# Patient Record
Sex: MALE | Race: OTHER | HISPANIC OR LATINO | URBAN - METROPOLITAN AREA
[De-identification: names, ages, dates, MRNs, and addresses within clinical notes are randomized per-mention and may not be internally consistent; named-entity substitution may affect disease eponyms.]

---

## 2017-04-28 ENCOUNTER — EMERGENCY (EMERGENCY)
Facility: HOSPITAL | Age: 32
LOS: 1 days | Discharge: HOME | End: 2017-04-28
Attending: EMERGENCY MEDICINE | Admitting: EMERGENCY MEDICINE
Payer: SELF-PAY

## 2017-04-28 VITALS
RESPIRATION RATE: 18 BRPM | HEIGHT: 66 IN | HEART RATE: 74 BPM | SYSTOLIC BLOOD PRESSURE: 112 MMHG | TEMPERATURE: 98 F | OXYGEN SATURATION: 99 % | DIASTOLIC BLOOD PRESSURE: 79 MMHG | WEIGHT: 175.05 LBS

## 2017-04-28 VITALS — DIASTOLIC BLOOD PRESSURE: 80 MMHG | SYSTOLIC BLOOD PRESSURE: 110 MMHG | HEART RATE: 78 BPM

## 2017-04-28 PROCEDURE — 99284 EMERGENCY DEPT VISIT MOD MDM: CPT

## 2017-04-28 PROCEDURE — 96372 THER/PROPH/DIAG INJ SC/IM: CPT

## 2017-04-28 PROCEDURE — 99284 EMERGENCY DEPT VISIT MOD MDM: CPT | Mod: 25

## 2017-04-28 PROCEDURE — 72100 X-RAY EXAM L-S SPINE 2/3 VWS: CPT | Mod: 26

## 2017-04-28 PROCEDURE — 73030 X-RAY EXAM OF SHOULDER: CPT | Mod: 26,RT

## 2017-04-28 PROCEDURE — 72100 X-RAY EXAM L-S SPINE 2/3 VWS: CPT

## 2017-04-28 PROCEDURE — 73030 X-RAY EXAM OF SHOULDER: CPT

## 2017-04-28 PROCEDURE — T1013: CPT

## 2017-04-28 RX ORDER — METHOCARBAMOL 500 MG/1
1 TABLET, FILM COATED ORAL
Qty: 15 | Refills: 0 | OUTPATIENT
Start: 2017-04-28

## 2017-04-28 RX ORDER — HYDROMORPHONE HYDROCHLORIDE 2 MG/ML
0.5 INJECTION INTRAMUSCULAR; INTRAVENOUS; SUBCUTANEOUS ONCE
Qty: 0 | Refills: 0 | Status: DISCONTINUED | OUTPATIENT
Start: 2017-04-28 | End: 2017-04-28

## 2017-04-28 RX ORDER — DEXAMETHASONE 0.5 MG/5ML
10 ELIXIR ORAL ONCE
Qty: 0 | Refills: 0 | Status: COMPLETED | OUTPATIENT
Start: 2017-04-28 | End: 2017-04-28

## 2017-04-28 RX ORDER — IBUPROFEN 200 MG
1 TABLET ORAL
Qty: 20 | Refills: 0 | OUTPATIENT
Start: 2017-04-28

## 2017-04-28 RX ADMIN — HYDROMORPHONE HYDROCHLORIDE 0.5 MILLIGRAM(S): 2 INJECTION INTRAMUSCULAR; INTRAVENOUS; SUBCUTANEOUS at 19:40

## 2017-04-28 RX ADMIN — Medication 10 MILLIGRAM(S): at 19:39

## 2017-04-28 NOTE — ED STATDOCS - PROGRESS NOTE DETAILS
Pt states he was at work when he attempted to lift a heavy box of alexa, lost his balance and fell backwards onto cardboard boxes. Denies any head injury or LOC. Complaining of lower back pain, described as dull achy sensation. admits to shoulder pain but resolved on its own.  Denies abd pain/CP/SOB/palpitations/N/V/LE weakness or tingling sensation.   Gen- NAD chest-s1s2 Lungs- CTA b/l Abd- soft, Nt, ND Back- no midline tenderness, + left and right sided lumbar paraspinal TTP, Neuro- no neuro deficts. Pt re-assessed, pain improving, pt able to ambualte more comfortable. Results discussed with pt, advised to apply heat, take meds as directed, f.u with ortho. if any symptoms worsen or any new symptoms occur, return to ED>

## 2017-04-28 NOTE — ED STATDOCS - ATTENDING CONTRIBUTION TO CARE
I, Chris Vences, performed the initial face to face bedside interview with this patient regarding history of present illness, review of symptoms and relevant past medical, social and family history.  I completed an independent physical examination.  I was the initial provider who evaluated this patient. I have signed out the follow up of any pending tests (i.e. labs, radiological studies) to the ACP.  I have communicated the patient’s plan of care and disposition with the ACP.  The history, relevant review of systems, past medical and surgical history, medical decision making, and physical examination was documented by the scribe in my presence and I attest to the accuracy of the documentation.

## 2017-04-28 NOTE — ED STATDOCS - OBJECTIVE STATEMENT
30 y/o M pt presents to ED c/o lower back pain that radiates from left to right side and right shoulder pain since earlier today. Pt reports he tried to lift a heavy box of alexa at work today when he fell backwards and hurt his back. Pt denies LOC, head injury, CP, SOB, abdominal pain, nausea, vomiting, incontinence, and numbness/tingling. No further complaints at this time. NKDA.  : Samantha

## 2019-03-01 ENCOUNTER — EMERGENCY (EMERGENCY)
Facility: HOSPITAL | Age: 34
LOS: 1 days | End: 2019-03-01

## 2019-03-01 VITALS
SYSTOLIC BLOOD PRESSURE: 113 MMHG | DIASTOLIC BLOOD PRESSURE: 70 MMHG | OXYGEN SATURATION: 99 % | RESPIRATION RATE: 18 BRPM | WEIGHT: 184.97 LBS | HEIGHT: 66 IN | HEART RATE: 60 BPM | TEMPERATURE: 99 F

## 2019-03-02 ENCOUNTER — EMERGENCY (EMERGENCY)
Facility: HOSPITAL | Age: 34
LOS: 1 days | Discharge: HOME | End: 2019-03-02
Attending: EMERGENCY MEDICINE | Admitting: EMERGENCY MEDICINE
Payer: COMMERCIAL

## 2019-03-02 VITALS
RESPIRATION RATE: 18 BRPM | WEIGHT: 186.07 LBS | SYSTOLIC BLOOD PRESSURE: 113 MMHG | DIASTOLIC BLOOD PRESSURE: 68 MMHG | OXYGEN SATURATION: 100 % | HEART RATE: 70 BPM | TEMPERATURE: 98 F | HEIGHT: 66 IN

## 2019-03-02 PROCEDURE — T1013: CPT

## 2019-03-02 PROCEDURE — 99283 EMERGENCY DEPT VISIT LOW MDM: CPT | Mod: 25

## 2019-03-02 PROCEDURE — 96372 THER/PROPH/DIAG INJ SC/IM: CPT

## 2019-03-02 PROCEDURE — 99283 EMERGENCY DEPT VISIT LOW MDM: CPT

## 2019-03-02 RX ORDER — KETOROLAC TROMETHAMINE 30 MG/ML
60 SYRINGE (ML) INJECTION ONCE
Qty: 0 | Refills: 0 | Status: DISCONTINUED | OUTPATIENT
Start: 2019-03-02 | End: 2019-03-02

## 2019-03-02 RX ORDER — METHOCARBAMOL 500 MG/1
2 TABLET, FILM COATED ORAL
Qty: 30 | Refills: 0 | OUTPATIENT
Start: 2019-03-02 | End: 2019-03-06

## 2019-03-02 RX ORDER — LIDOCAINE 4 G/100G
1 CREAM TOPICAL ONCE
Qty: 0 | Refills: 0 | Status: COMPLETED | OUTPATIENT
Start: 2019-03-02 | End: 2019-03-02

## 2019-03-02 RX ORDER — IBUPROFEN 200 MG
1 TABLET ORAL
Qty: 9 | Refills: 0 | OUTPATIENT
Start: 2019-03-02 | End: 2019-03-04

## 2019-03-02 RX ORDER — METHOCARBAMOL 500 MG/1
750 TABLET, FILM COATED ORAL ONCE
Qty: 0 | Refills: 0 | Status: COMPLETED | OUTPATIENT
Start: 2019-03-02 | End: 2019-03-02

## 2019-03-02 RX ADMIN — METHOCARBAMOL 750 MILLIGRAM(S): 500 TABLET, FILM COATED ORAL at 11:28

## 2019-03-02 RX ADMIN — Medication 60 MILLIGRAM(S): at 11:28

## 2019-03-02 RX ADMIN — LIDOCAINE 1 PATCH: 4 CREAM TOPICAL at 11:29

## 2019-03-02 NOTE — ED ADULT TRIAGE NOTE - NS ED NURSE DIRECT TO ROOM YN
0747: Code called. No pulse and pt unresponsive.  0748: Pt responsive after sternal rub and pulse felt by Sammie Skinner RN while placing pt in CPR position. No compressions done.  0749: Cecy Meraz NP and  at bedside            STAT EKG ordered and pt placed on nonrebreather            Code stopped and rapid response initiated  0750:IM K paged. /85 BP, 95% nonrebreather, 46 HR, 122 MAP, blood glucose 140, pt responsive to voice and following commands  0751: Abelardo Bazan MD arrived  0753: EKG performed; 190/81 BP, 117 MAP, 77 HR, 99%                nonrebreather  0754: VS: 182/79, 100% nonrebreather, 80 HR, 114 MAP  0806: rapid response stopped  
No

## 2019-03-02 NOTE — ED STATDOCS - PHYSICAL EXAMINATION
Const: Appears well, in no acute distress  HEENT: No conjunctival injection, no rhinorrhea, tongue midline  Neck: No midline TTP, + left upper paraspinal muscular TTP without hypertonicity. Decreased ROM with rotation to the right and with extension 2/2 pain.  Cardiac: RRR, + S1/S2, no murmurs  Resp: CTA B/L, no wheezes  ABD: Soft, NT/ND  Ext: Full, symmetrical ROM  Neuro: 5/5  strength bilateral upper extremities, normal sensation symmetrically bilateral upper extremities.  Skin: No rashes or lacerations appreciated.

## 2019-03-02 NOTE — ED STATDOCS - NS ED ROS FT
Const: no fevers, no chills  HEENT: no rhinorrhea, no sore throat  Cardiac: no palpitations, no chest pain  Resp: no wheezing, no SOB  ABD: no ABD pain, no vomiting, no diarrhea  : no dysuria, no discharge  MSK: + Left sided neck pain  Ext: no deformity  Skin: no rashes, no lacerations

## 2019-03-02 NOTE — ED STATDOCS - CLINICAL SUMMARY MEDICAL DECISION MAKING FREE TEXT BOX
Patient presents complaining of one week of paraspinal left sided neck pain which started while working his physical job. No neuro deficits, no bony TTP, no trauma. Decreased ROM with rotation to the right and extension 2/2 pain. Neuro intact. Will give toradol IM, lidoderm patch and robaxin and send Rx to pharmacy. Recommended a few days off from work for rest.

## 2019-03-02 NOTE — ED STATDOCS - OBJECTIVE STATEMENT
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 It has not worsened, just not improved. He describes the pain as if "there's something there." The pain is exacerbated by moving his neck around. It does not radiate. He has tried ibuprofen and diclofenac without improvement. He last took the medication at 9 pm last night. He has otherwise been in his usual state of good health. 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 It has not worsened, just not improved. He describes the pain as if "there's something there." The pain is exacerbated by moving his neck around. It does not radiate. He has tried ibuprofen and diclofenac without improvement. He last took the medication at 9 pm last night. He has otherwise been in his usual state of good health.

## 2019-04-06 ENCOUNTER — EMERGENCY (EMERGENCY)
Facility: HOSPITAL | Age: 34
LOS: 1 days | Discharge: HOME | End: 2019-04-06
Attending: EMERGENCY MEDICINE
Payer: COMMERCIAL

## 2019-04-06 VITALS
HEART RATE: 61 BPM | SYSTOLIC BLOOD PRESSURE: 111 MMHG | TEMPERATURE: 99 F | HEIGHT: 66 IN | WEIGHT: 184.09 LBS | RESPIRATION RATE: 20 BRPM | DIASTOLIC BLOOD PRESSURE: 60 MMHG | OXYGEN SATURATION: 99 %

## 2019-04-06 VITALS
TEMPERATURE: 98 F | DIASTOLIC BLOOD PRESSURE: 74 MMHG | SYSTOLIC BLOOD PRESSURE: 130 MMHG | RESPIRATION RATE: 20 BRPM | HEART RATE: 72 BPM | OXYGEN SATURATION: 97 %

## 2019-04-06 LAB
ALBUMIN SERPL ELPH-MCNC: 4.7 G/DL — SIGNIFICANT CHANGE UP (ref 3.3–5.2)
ALP SERPL-CCNC: 66 U/L — SIGNIFICANT CHANGE UP (ref 40–120)
ALT FLD-CCNC: 24 U/L — SIGNIFICANT CHANGE UP
ANION GAP SERPL CALC-SCNC: 12 MMOL/L — SIGNIFICANT CHANGE UP (ref 5–17)
AST SERPL-CCNC: 15 U/L — SIGNIFICANT CHANGE UP
BASOPHILS # BLD AUTO: 0 K/UL — SIGNIFICANT CHANGE UP (ref 0–0.2)
BASOPHILS NFR BLD AUTO: 0.2 % — SIGNIFICANT CHANGE UP (ref 0–2)
BILIRUB SERPL-MCNC: 0.4 MG/DL — SIGNIFICANT CHANGE UP (ref 0.4–2)
BUN SERPL-MCNC: 14 MG/DL — SIGNIFICANT CHANGE UP (ref 8–20)
CALCIUM SERPL-MCNC: 9.4 MG/DL — SIGNIFICANT CHANGE UP (ref 8.6–10.2)
CHLORIDE SERPL-SCNC: 103 MMOL/L — SIGNIFICANT CHANGE UP (ref 98–107)
CO2 SERPL-SCNC: 26 MMOL/L — SIGNIFICANT CHANGE UP (ref 22–29)
CREAT SERPL-MCNC: 1 MG/DL — SIGNIFICANT CHANGE UP (ref 0.5–1.3)
EOSINOPHIL # BLD AUTO: 0.1 K/UL — SIGNIFICANT CHANGE UP (ref 0–0.5)
EOSINOPHIL NFR BLD AUTO: 0.9 % — SIGNIFICANT CHANGE UP (ref 0–5)
GLUCOSE SERPL-MCNC: 79 MG/DL — SIGNIFICANT CHANGE UP (ref 70–115)
HCT VFR BLD CALC: 44.1 % — SIGNIFICANT CHANGE UP (ref 42–52)
HGB BLD-MCNC: 15.8 G/DL — SIGNIFICANT CHANGE UP (ref 14–18)
LYMPHOCYTES # BLD AUTO: 1.3 K/UL — SIGNIFICANT CHANGE UP (ref 1–4.8)
LYMPHOCYTES # BLD AUTO: 15 % — LOW (ref 20–55)
MCHC RBC-ENTMCNC: 27.1 PG — SIGNIFICANT CHANGE UP (ref 27–31)
MCHC RBC-ENTMCNC: 35.8 G/DL — SIGNIFICANT CHANGE UP (ref 32–36)
MCV RBC AUTO: 75.8 FL — LOW (ref 80–94)
MONOCYTES # BLD AUTO: 0.5 K/UL — SIGNIFICANT CHANGE UP (ref 0–0.8)
MONOCYTES NFR BLD AUTO: 5.2 % — SIGNIFICANT CHANGE UP (ref 3–10)
NEUTROPHILS # BLD AUTO: 7 K/UL — SIGNIFICANT CHANGE UP (ref 1.8–8)
NEUTROPHILS NFR BLD AUTO: 78.6 % — HIGH (ref 37–73)
PLATELET # BLD AUTO: 163 K/UL — SIGNIFICANT CHANGE UP (ref 150–400)
POTASSIUM SERPL-MCNC: 4.2 MMOL/L — SIGNIFICANT CHANGE UP (ref 3.5–5.3)
POTASSIUM SERPL-SCNC: 4.2 MMOL/L — SIGNIFICANT CHANGE UP (ref 3.5–5.3)
PROT SERPL-MCNC: 7.3 G/DL — SIGNIFICANT CHANGE UP (ref 6.6–8.7)
RBC # BLD: 5.82 M/UL — SIGNIFICANT CHANGE UP (ref 4.6–6.2)
RBC # FLD: 13.3 % — SIGNIFICANT CHANGE UP (ref 11–15.6)
SODIUM SERPL-SCNC: 141 MMOL/L — SIGNIFICANT CHANGE UP (ref 135–145)
WBC # BLD: 8.9 K/UL — SIGNIFICANT CHANGE UP (ref 4.8–10.8)
WBC # FLD AUTO: 8.9 K/UL — SIGNIFICANT CHANGE UP (ref 4.8–10.8)

## 2019-04-06 PROCEDURE — 70487 CT MAXILLOFACIAL W/DYE: CPT

## 2019-04-06 PROCEDURE — 80053 COMPREHEN METABOLIC PANEL: CPT

## 2019-04-06 PROCEDURE — 70487 CT MAXILLOFACIAL W/DYE: CPT | Mod: 26

## 2019-04-06 PROCEDURE — 99284 EMERGENCY DEPT VISIT MOD MDM: CPT

## 2019-04-06 PROCEDURE — 99284 EMERGENCY DEPT VISIT MOD MDM: CPT | Mod: 25

## 2019-04-06 PROCEDURE — 36415 COLL VENOUS BLD VENIPUNCTURE: CPT

## 2019-04-06 PROCEDURE — T1013: CPT

## 2019-04-06 PROCEDURE — 96374 THER/PROPH/DIAG INJ IV PUSH: CPT | Mod: XU

## 2019-04-06 PROCEDURE — 85027 COMPLETE CBC AUTOMATED: CPT

## 2019-04-06 RX ORDER — KETOROLAC TROMETHAMINE 30 MG/ML
30 SYRINGE (ML) INJECTION ONCE
Qty: 0 | Refills: 0 | Status: DISCONTINUED | OUTPATIENT
Start: 2019-04-06 | End: 2019-04-06

## 2019-04-06 RX ORDER — IBUPROFEN 200 MG
1 TABLET ORAL
Qty: 28 | Refills: 0 | OUTPATIENT
Start: 2019-04-06 | End: 2019-04-12

## 2019-04-06 RX ADMIN — Medication 30 MILLIGRAM(S): at 15:37

## 2019-04-06 NOTE — ED PROVIDER NOTE - PROGRESS NOTE DETAILS
BETZY Toro: Went to CT. Only one nurse. There is one patient ahead in line. BETZY Toro: Patient stable for discharge, reporting improvement. Vital signs are stable, tolerating PO, ambulatory in ED. Discussed with patient including but not limited to results, proper medication use, and precautions/potential side effects. As discussed, patient to follow up with PMD in 24-48 hours as well as any specialists that may have been discussed/provided. Patient received copies of lab/radiology results to aid in proper outpatient follow up. Educated on return precautions. Advised to seek immediate medical attention for any new/worsening signs/symptoms or concerns. Patient/guardian provided ample opportunity to ask questions which were answered to the fullest extent. Patient/guardian verbalized understanding and agreement of plan. BETZY Toro: Language Services utilized in obtaining the HPI and throughout the duration of the patient's care.

## 2019-04-06 NOTE — ED PROVIDER NOTE - OBJECTIVE STATEMENT
34 yo male no PMHx c/o ear pain x4 hour. States first noticed while chewing and experienced pain. Describes 5/10, sharp pain. Did not self-medicate PTA. Associated sxms include swelling. Received vaccinations as a child. No further complaints at this time.   Denies fever/chills, testicular pain/swelling.  PCP: None

## 2019-04-06 NOTE — ED PROVIDER NOTE - CLINICAL SUMMARY MEDICAL DECISION MAKING FREE TEXT BOX
34 yo male no PMHx c/o ear pain x4 hours. Concern for parotiditis.   Plan:  1. Right-sided facial swelling, consider parotiditis  - Labs  - CT  - Pain control  - Reassess

## 2019-04-06 NOTE — ED ADULT NURSE NOTE - OBJECTIVE STATEMENT
pt c/o right ear pain pt c/o right ear pain that started this morning. pt states he noticed pain when he was chewing. there are no obvious deformity noted. pt is awaiting ct at this time.

## 2019-04-06 NOTE — ED PROVIDER NOTE - ATTENDING CONTRIBUTION TO CARE
I, Genet Pfeiffer, performed the initial face to face bedside interview with this patient regarding history of present illness, review of symptoms and relevant past medical, social and family history.  I completed an independent physical examination.  I was the initial provider who evaluated this patient. I have signed out the follow up of any pending tests (i.e. labs, radiological studies) to the ACP.  I have communicated the patient’s plan of care and disposition with the ACP.

## 2019-04-06 NOTE — ED PROVIDER NOTE - PHYSICAL EXAMINATION
General: In NAD, non-toxic appearing; well nourished/developed.  Skin: No rashes or lesions. Warm, dry, color normal for race. No cyanosis or jaundice appreciated.   Eyes: Sclera anicteric, conjunctivae clear b/l. No discharge. PERRLA, EOMI.  Ears: Auricles/tragi symmetrical without lesions/deformities, non-tender b/l. Canals clear and without erythema b/l. TMs pearly, grey, mobile, with landmarks and light reflex intact b/l.  Mouth/Throat: Airway patent. Tolerating secretions, no drooling. Lips and buccal mucosa pink, moist, and without lesions. Swelling and TTP over right parotid gland; no stones palpated. No surrounding erythema or signs of abscess. Tongue midline, no lesions. Tonsils and pharynx without erythema or exudates. Tonsils not enlarged. Uvula midline, rises symmetrically.  Neck: Supple, FROM. No lymphadenopathy or pre/postauricular, occipital, tonsillar, submandibular, submental, superficial/deep cervical, or supraclavicular nodes. Trachea midline.  Cardio: No lifts, heaves, visible pulsations. No thrills. Rate and rhythm regular, S1 & S2 clear. No audible murmur, gallop, or rub.   Resp: Normal AP to lateral diameter, symmetrical excursion b/l. Breath sounds vesicular, symmetrical and without rales, rhonchi or wheezing b/l. General: In NAD, non-toxic appearing; well nourished/developed.  Skin: No rashes or lesions. Warm, dry, color normal for race. No cyanosis or jaundice appreciated.   Eyes: Sclera anicteric, conjunctivae clear b/l. No discharge. PERRLA, EOMI.  Ears: Auricles/tragi symmetrical without lesions/deformities, non-tender b/l. Canals clear and without erythema b/l. TMs pearly, grey, mobile, with landmarks and light reflex intact b/l.  Mouth/Throat: Airway patent. Tolerating secretions, no drooling. Lips and buccal mucosa pink, moist, and without lesions. Swelling and TTP over right parotid gland/prearicular area extending to mandible ; no stones palpated. No surrounding erythema or signs of abscess. Tongue midline, no lesions. Tonsils and pharynx without erythema or exudates. Tonsils not enlarged. Uvula midline, rises symmetrically.  Neck: Supple, FROM. No lymphadenopathy or pre/postauricular, occipital, tonsillar, submandibular, submental, superficial/deep cervical, or supraclavicular nodes. Trachea midline.  Cardio: No lifts, heaves, visible pulsations. No thrills. Rate and rhythm regular, S1 & S2 clear. No audible murmur, gallop, or rub.   Resp: Normal AP to lateral diameter, symmetrical excursion b/l. Breath sounds vesicular, symmetrical and without rales, rhonchi or wheezing b/l.

## 2020-02-11 NOTE — ED PROVIDER NOTE - TEMPLATE, MLM
Alert-The patient is alert, awake and responds to voice. The patient is oriented to time, place, and person. The triage nurse is able to obtain subjective information.
EENMT

## 2020-10-02 ENCOUNTER — EMERGENCY (EMERGENCY)
Facility: HOSPITAL | Age: 35
LOS: 1 days | Discharge: HOME | End: 2020-10-02
Attending: EMERGENCY MEDICINE
Payer: COMMERCIAL

## 2020-10-02 VITALS
SYSTOLIC BLOOD PRESSURE: 124 MMHG | RESPIRATION RATE: 15 BRPM | OXYGEN SATURATION: 99 % | TEMPERATURE: 98 F | DIASTOLIC BLOOD PRESSURE: 81 MMHG | HEART RATE: 61 BPM

## 2020-10-02 VITALS
WEIGHT: 149.91 LBS | HEART RATE: 58 BPM | OXYGEN SATURATION: 99 % | DIASTOLIC BLOOD PRESSURE: 85 MMHG | TEMPERATURE: 99 F | RESPIRATION RATE: 16 BRPM | SYSTOLIC BLOOD PRESSURE: 145 MMHG | HEIGHT: 66 IN

## 2020-10-02 LAB
ALBUMIN SERPL ELPH-MCNC: 4.4 G/DL — SIGNIFICANT CHANGE UP (ref 3.3–5.2)
ALP SERPL-CCNC: 64 U/L — SIGNIFICANT CHANGE UP (ref 40–120)
ALT FLD-CCNC: 32 U/L — SIGNIFICANT CHANGE UP
ANION GAP SERPL CALC-SCNC: 10 MMOL/L — SIGNIFICANT CHANGE UP (ref 5–17)
AST SERPL-CCNC: 18 U/L — SIGNIFICANT CHANGE UP
BASOPHILS # BLD AUTO: 0.03 K/UL — SIGNIFICANT CHANGE UP (ref 0–0.2)
BASOPHILS NFR BLD AUTO: 0.4 % — SIGNIFICANT CHANGE UP (ref 0–2)
BILIRUB SERPL-MCNC: 0.3 MG/DL — LOW (ref 0.4–2)
BUN SERPL-MCNC: 17 MG/DL — SIGNIFICANT CHANGE UP (ref 8–20)
CALCIUM SERPL-MCNC: 8.9 MG/DL — SIGNIFICANT CHANGE UP (ref 8.6–10.2)
CHLORIDE SERPL-SCNC: 104 MMOL/L — SIGNIFICANT CHANGE UP (ref 98–107)
CO2 SERPL-SCNC: 25 MMOL/L — SIGNIFICANT CHANGE UP (ref 22–29)
CREAT SERPL-MCNC: 1.21 MG/DL — SIGNIFICANT CHANGE UP (ref 0.5–1.3)
D DIMER BLD IA.RAPID-MCNC: <150 NG/ML DDU — SIGNIFICANT CHANGE UP
EOSINOPHIL # BLD AUTO: 0.08 K/UL — SIGNIFICANT CHANGE UP (ref 0–0.5)
EOSINOPHIL NFR BLD AUTO: 1.1 % — SIGNIFICANT CHANGE UP (ref 0–6)
GLUCOSE SERPL-MCNC: 95 MG/DL — SIGNIFICANT CHANGE UP (ref 70–99)
HCT VFR BLD CALC: 43.5 % — SIGNIFICANT CHANGE UP (ref 39–50)
HGB BLD-MCNC: 14.9 G/DL — SIGNIFICANT CHANGE UP (ref 13–17)
IMM GRANULOCYTES NFR BLD AUTO: 0.3 % — SIGNIFICANT CHANGE UP (ref 0–1.5)
LYMPHOCYTES # BLD AUTO: 1.54 K/UL — SIGNIFICANT CHANGE UP (ref 1–3.3)
LYMPHOCYTES # BLD AUTO: 21.2 % — SIGNIFICANT CHANGE UP (ref 13–44)
MCHC RBC-ENTMCNC: 26.2 PG — LOW (ref 27–34)
MCHC RBC-ENTMCNC: 34.3 GM/DL — SIGNIFICANT CHANGE UP (ref 32–36)
MCV RBC AUTO: 76.4 FL — LOW (ref 80–100)
MONOCYTES # BLD AUTO: 0.3 K/UL — SIGNIFICANT CHANGE UP (ref 0–0.9)
MONOCYTES NFR BLD AUTO: 4.1 % — SIGNIFICANT CHANGE UP (ref 2–14)
NEUTROPHILS # BLD AUTO: 5.29 K/UL — SIGNIFICANT CHANGE UP (ref 1.8–7.4)
NEUTROPHILS NFR BLD AUTO: 72.9 % — SIGNIFICANT CHANGE UP (ref 43–77)
PLATELET # BLD AUTO: 152 K/UL — SIGNIFICANT CHANGE UP (ref 150–400)
POTASSIUM SERPL-MCNC: 4.1 MMOL/L — SIGNIFICANT CHANGE UP (ref 3.5–5.3)
POTASSIUM SERPL-SCNC: 4.1 MMOL/L — SIGNIFICANT CHANGE UP (ref 3.5–5.3)
PROT SERPL-MCNC: 6.7 G/DL — SIGNIFICANT CHANGE UP (ref 6.6–8.7)
RBC # BLD: 5.69 M/UL — SIGNIFICANT CHANGE UP (ref 4.2–5.8)
RBC # FLD: 13 % — SIGNIFICANT CHANGE UP (ref 10.3–14.5)
SODIUM SERPL-SCNC: 139 MMOL/L — SIGNIFICANT CHANGE UP (ref 135–145)
TROPONIN T SERPL-MCNC: <0.01 NG/ML — SIGNIFICANT CHANGE UP (ref 0–0.06)
WBC # BLD: 7.26 K/UL — SIGNIFICANT CHANGE UP (ref 3.8–10.5)
WBC # FLD AUTO: 7.26 K/UL — SIGNIFICANT CHANGE UP (ref 3.8–10.5)

## 2020-10-02 PROCEDURE — 99285 EMERGENCY DEPT VISIT HI MDM: CPT

## 2020-10-02 PROCEDURE — 99284 EMERGENCY DEPT VISIT MOD MDM: CPT | Mod: 25

## 2020-10-02 PROCEDURE — 84484 ASSAY OF TROPONIN QUANT: CPT

## 2020-10-02 PROCEDURE — 71045 X-RAY EXAM CHEST 1 VIEW: CPT

## 2020-10-02 PROCEDURE — 36415 COLL VENOUS BLD VENIPUNCTURE: CPT

## 2020-10-02 PROCEDURE — 93005 ELECTROCARDIOGRAM TRACING: CPT

## 2020-10-02 PROCEDURE — 96374 THER/PROPH/DIAG INJ IV PUSH: CPT

## 2020-10-02 PROCEDURE — 93010 ELECTROCARDIOGRAM REPORT: CPT

## 2020-10-02 PROCEDURE — 85379 FIBRIN DEGRADATION QUANT: CPT

## 2020-10-02 PROCEDURE — 71045 X-RAY EXAM CHEST 1 VIEW: CPT | Mod: 26

## 2020-10-02 PROCEDURE — 80053 COMPREHEN METABOLIC PANEL: CPT

## 2020-10-02 PROCEDURE — 85025 COMPLETE CBC W/AUTO DIFF WBC: CPT

## 2020-10-02 RX ORDER — SODIUM CHLORIDE 9 MG/ML
3 INJECTION INTRAMUSCULAR; INTRAVENOUS; SUBCUTANEOUS ONCE
Refills: 0 | Status: COMPLETED | OUTPATIENT
Start: 2020-10-02 | End: 2020-10-02

## 2020-10-02 RX ORDER — IBUPROFEN 200 MG
1 TABLET ORAL
Qty: 40 | Refills: 0
Start: 2020-10-02 | End: 2020-10-11

## 2020-10-02 RX ORDER — KETOROLAC TROMETHAMINE 30 MG/ML
30 SYRINGE (ML) INJECTION ONCE
Refills: 0 | Status: DISCONTINUED | OUTPATIENT
Start: 2020-10-02 | End: 2020-10-02

## 2020-10-02 RX ADMIN — SODIUM CHLORIDE 3 MILLILITER(S): 9 INJECTION INTRAMUSCULAR; INTRAVENOUS; SUBCUTANEOUS at 18:50

## 2020-10-02 RX ADMIN — Medication 30 MILLIGRAM(S): at 18:54

## 2020-10-02 NOTE — ED PROVIDER NOTE - OBJECTIVE STATEMENT
35 yo male with c/o chest pain  sudden onset at 2 am this morning. Described as tightness. SS radiating to L chest. No sob n v diaphoresis.  + lightheaded. Persistent with intermittent exacerbations.   Went to UC and told abnormal ekg ( no abnormalities seen) and sent to ER. Received 4 aspirin with relief of symptoms  no sig med hx no sig fhx no smoking

## 2020-10-02 NOTE — ED PROVIDER NOTE - CARE PROVIDER_API CALL
Fco Tello V  CARDIOLOGY  [address] [place] [address]  Phone: [phone]  Fax: [phone]  Follow Up Time:

## 2020-10-02 NOTE — ED ADULT TRIAGE NOTE - CHIEF COMPLAINT QUOTE
pt BIBA from urgent care for abnormal ekg. pt c/o 7/10 chest pain since midnight, was given ASA PTA. denies radiation of pain/discomfort, sob, n/v

## 2020-10-02 NOTE — ED ADULT NURSE REASSESSMENT NOTE - NS ED NURSE REASSESS COMMENT FT1
received pt at 1930 from previous RN. Pt aao x4. moves all extremities with equal strength, Respirations even and unlabored. NSR on cardiac monitor. skin color wnl warm and dry. no complaints at this time. denies chest pain, sob, dizziness. pt cleared for discharge. pt given and explained discharge instructions, verbalized understanding. sergio removed site benign. pt ambulated out w steady gait in no acute distress.

## 2020-10-02 NOTE — ED PROVIDER NOTE - NSFOLLOWUPINSTRUCTIONS_ED_ALL_ED_FT
A la casa y descansa  [place] rahel pastilla cada 6 hora por dolor  kendrick rahel jen con Dr Tello, especialista de cornelia  regresa si tiene unas problemas

## 2020-10-02 NOTE — ED PROVIDER NOTE - PATIENT PORTAL LINK FT
You can access the FollowMyHealth Patient Portal offered by [facility] by registering at the following website: [email or web address]. By joining [employer]’s FollowMyHealth portal, you will also be able to view your health information using other applications (apps) compatible with our system.

## 2020-10-02 NOTE — ED PROVIDER NOTE - CLINICAL SUMMARY MEDICAL DECISION MAKING FREE TEXT BOX
34 y o male with chest tightness since 2 am. "tightness" radiates from SS area to left chest.  no associated symptoms. No risk factors  pe documented  lab xra y ekg meds reassess

## 2020-10-05 ENCOUNTER — EMERGENCY (EMERGENCY)
Facility: HOSPITAL | Age: 35
LOS: 1 days | Discharge: HOME | End: 2020-10-05
Attending: EMERGENCY MEDICINE
Payer: COMMERCIAL

## 2020-10-05 VITALS
HEART RATE: 51 BPM | TEMPERATURE: 98 F | DIASTOLIC BLOOD PRESSURE: 82 MMHG | HEIGHT: 66 IN | RESPIRATION RATE: 18 BRPM | SYSTOLIC BLOOD PRESSURE: 124 MMHG | WEIGHT: 187.39 LBS | OXYGEN SATURATION: 100 %

## 2020-10-05 PROBLEM — Z00.00 ENCOUNTER FOR PREVENTIVE HEALTH EXAMINATION: Status: ACTIVE | Noted: 2020-10-05

## 2020-10-05 LAB
ANION GAP SERPL CALC-SCNC: 6 MMOL/L — SIGNIFICANT CHANGE UP (ref 5–17)
BASOPHILS # BLD AUTO: 0.02 K/UL — SIGNIFICANT CHANGE UP (ref 0–0.2)
BASOPHILS NFR BLD AUTO: 0.2 % — SIGNIFICANT CHANGE UP (ref 0–2)
BUN SERPL-MCNC: 13 MG/DL — SIGNIFICANT CHANGE UP (ref 8–20)
CALCIUM SERPL-MCNC: 9.2 MG/DL — SIGNIFICANT CHANGE UP (ref 8.6–10.2)
CHLORIDE SERPL-SCNC: 102 MMOL/L — SIGNIFICANT CHANGE UP (ref 98–107)
CO2 SERPL-SCNC: 28 MMOL/L — SIGNIFICANT CHANGE UP (ref 22–29)
CREAT SERPL-MCNC: 0.86 MG/DL — SIGNIFICANT CHANGE UP (ref 0.5–1.3)
EOSINOPHIL # BLD AUTO: 0.1 K/UL — SIGNIFICANT CHANGE UP (ref 0–0.5)
EOSINOPHIL NFR BLD AUTO: 1.2 % — SIGNIFICANT CHANGE UP (ref 0–6)
GLUCOSE SERPL-MCNC: 91 MG/DL — SIGNIFICANT CHANGE UP (ref 70–99)
HCT VFR BLD CALC: 43.8 % — SIGNIFICANT CHANGE UP (ref 39–50)
HGB BLD-MCNC: 15 G/DL — SIGNIFICANT CHANGE UP (ref 13–17)
IMM GRANULOCYTES NFR BLD AUTO: 0.2 % — SIGNIFICANT CHANGE UP (ref 0–1.5)
LYMPHOCYTES # BLD AUTO: 2.31 K/UL — SIGNIFICANT CHANGE UP (ref 1–3.3)
LYMPHOCYTES # BLD AUTO: 28.6 % — SIGNIFICANT CHANGE UP (ref 13–44)
MCHC RBC-ENTMCNC: 26.2 PG — LOW (ref 27–34)
MCHC RBC-ENTMCNC: 34.2 GM/DL — SIGNIFICANT CHANGE UP (ref 32–36)
MCV RBC AUTO: 76.4 FL — LOW (ref 80–100)
MONOCYTES # BLD AUTO: 0.48 K/UL — SIGNIFICANT CHANGE UP (ref 0–0.9)
MONOCYTES NFR BLD AUTO: 5.9 % — SIGNIFICANT CHANGE UP (ref 2–14)
NEUTROPHILS # BLD AUTO: 5.14 K/UL — SIGNIFICANT CHANGE UP (ref 1.8–7.4)
NEUTROPHILS NFR BLD AUTO: 63.9 % — SIGNIFICANT CHANGE UP (ref 43–77)
PLATELET # BLD AUTO: 173 K/UL — SIGNIFICANT CHANGE UP (ref 150–400)
POTASSIUM SERPL-MCNC: 4.7 MMOL/L — SIGNIFICANT CHANGE UP (ref 3.5–5.3)
POTASSIUM SERPL-SCNC: 4.7 MMOL/L — SIGNIFICANT CHANGE UP (ref 3.5–5.3)
RBC # BLD: 5.73 M/UL — SIGNIFICANT CHANGE UP (ref 4.2–5.8)
RBC # FLD: 12.9 % — SIGNIFICANT CHANGE UP (ref 10.3–14.5)
SODIUM SERPL-SCNC: 136 MMOL/L — SIGNIFICANT CHANGE UP (ref 135–145)
TROPONIN T SERPL-MCNC: <0.01 NG/ML — SIGNIFICANT CHANGE UP (ref 0–0.06)
WBC # BLD: 8.07 K/UL — SIGNIFICANT CHANGE UP (ref 3.8–10.5)
WBC # FLD AUTO: 8.07 K/UL — SIGNIFICANT CHANGE UP (ref 3.8–10.5)

## 2020-10-05 PROCEDURE — 93005 ELECTROCARDIOGRAM TRACING: CPT

## 2020-10-05 PROCEDURE — 80048 BASIC METABOLIC PNL TOTAL CA: CPT

## 2020-10-05 PROCEDURE — 99284 EMERGENCY DEPT VISIT MOD MDM: CPT | Mod: 25

## 2020-10-05 PROCEDURE — 93010 ELECTROCARDIOGRAM REPORT: CPT

## 2020-10-05 PROCEDURE — 85025 COMPLETE CBC W/AUTO DIFF WBC: CPT

## 2020-10-05 PROCEDURE — 36415 COLL VENOUS BLD VENIPUNCTURE: CPT

## 2020-10-05 PROCEDURE — 84443 ASSAY THYROID STIM HORMONE: CPT

## 2020-10-05 PROCEDURE — 84484 ASSAY OF TROPONIN QUANT: CPT

## 2020-10-05 PROCEDURE — 96374 THER/PROPH/DIAG INJ IV PUSH: CPT

## 2020-10-05 PROCEDURE — 99285 EMERGENCY DEPT VISIT HI MDM: CPT

## 2020-10-05 RX ORDER — KETOROLAC TROMETHAMINE 30 MG/ML
30 SYRINGE (ML) INJECTION ONCE
Refills: 0 | Status: DISCONTINUED | OUTPATIENT
Start: 2020-10-05 | End: 2020-10-05

## 2020-10-05 RX ORDER — ACETAMINOPHEN 500 MG
975 TABLET ORAL ONCE
Refills: 0 | Status: COMPLETED | OUTPATIENT
Start: 2020-10-05 | End: 2020-10-05

## 2020-10-05 RX ADMIN — Medication 975 MILLIGRAM(S): at 03:21

## 2020-10-05 RX ADMIN — Medication 30 MILLIGRAM(S): at 03:21

## 2020-10-05 NOTE — ED PROVIDER NOTE - OBJECTIVE STATEMENT
pt presents with 3 days substrnal achy chest pain non radiating 5/10 in severity. no + perc criteria. no leg swelling no hemoptysis. denies fever. denies HA or neck pain. + chest pain no sob. no abd pain. no n/v/d. no urinary f/u/d. no back pain. no motor or sensory deficits. denies illicit drug use. no recent travel. no rash. no other acute issues symptoms or concerns

## 2020-10-05 NOTE — ED PROVIDER NOTE - CLINICAL SUMMARY MEDICAL DECISION MAKING FREE TEXT BOX
DDX considered: stable angina, aortic dissection, pericarditis, pneumonia, pe, pts, chest wall pain, esophageal spasm and abdominal emergencies. I have discussed with patient with negative troponin and normal ekg their 28 day cardiac risk and they have agreed to outpt cardio f/ut his week without fail. asa per day until then. return to the ed immediately for any intractable chest pain or sob. pt agrees to plan of care   f/u cards without fail acet ibu for pain, cxr reviewed form 2 days prior do not feel clicnall need to repeat

## 2020-10-05 NOTE — ED ADULT TRIAGE NOTE - CHIEF COMPLAINT QUOTE
patient states that he has chest pain radiating to his back denies N/V worse when e talks a lot, was here 3 days ago with same

## 2020-10-05 NOTE — ED PROVIDER NOTE - CROS ED HEME ALL NEG
A 56-year-old patient.  She is in for her last shot of three with no complaints.    Tolerated it well.  Follow up in six months.      HUBER/AKIL  dd: 08/08/2019 17:03:33 (CDT)  td: 08/09/2019 06:44:57 (CDT)  Doc ID   #[id number]  Job ID #[id number]    CC:         BRIEF HISTORY:    Patient presents for therapeutic injections in the bilateral Knee(s) for OA. No complaints expressed at this time. The patient was counseled about risks and benefits of knee Visco-supplementation and other injections in general, including but not limited to pain, infection even that requiring surgery to clean out, failure to provide relief, transient flare, tissue damage. Patient expressed understanding, was given the opportunity to ask questions and any questions answered and consented verbally. Time out performed for localization, medication and patient identification using multiple identifiers.    Procedure Note:    Following a standard, sterile 2-antiseptic prep and negative arthrocentesis, Euflexxa was instilled in the bilateral knee(s) with no immediate discomfort. The procedure was tolerated well with no immediate adverse effects.    Follow up in 6 months.      negative...

## 2020-10-05 NOTE — ED ADULT NURSE NOTE - CHPI ED NUR SYMPTOMS NEG
no back pain/no syncope/no shortness of breath/no fever/no nausea/no chills/no congestion/no vomiting/no diaphoresis/no dizziness

## 2020-10-05 NOTE — ED PROVIDER NOTE - PATIENT PORTAL LINK FT
You can access the FollowMyHealth Patient Portal offered by [facility] by registering at the following website: [email or web address]. By joining [employer]’s FollowMyHealth portal, you will also be able to view your health information using other applications (apps) compatible with our system.

## 2021-06-14 NOTE — ED PROVIDER NOTE - CARDIOVASCULAR [+], MLM
CHEST PAIN Terbinafine Counseling: Patient counseling regarding adverse effects of terbinafine including but not limited to headache, diarrhea, rash, upset stomach, liver function test abnormalities, itching, taste/smell disturbance, nausea, abdominal pain, and flatulence.  There is a rare possibility of liver failure that can occur when taking terbinafine.  The patient understands that a baseline LFT and kidney function test may be required. The patient verbalized understanding of the proper use and possible adverse effects of terbinafine.  All of the patient's questions and concerns were addressed.

## 2021-07-06 NOTE — ED STATDOCS - NS ED MD SCRIBE ATTENDING SCRIBE SECTIONS
Ok to resume chlorthalidone and stay off the Jardiance until he follows up with Dr. Cadet  
Patient notified resume chlorthalidone and stay off the Jardiance until he follows up with Dr. Cadte, patient verbalizes understanding    
Pt stops in office for nurse visit/BP check.  Pt feeling well, no concerns.     After resting comfortably 10 minutes, BP is checked with appropriate size cuff and is 138/84, pulse 48.     6/21/21 - Pt's Atenolol/Chlorthalidone 50/25 mg  was changed to just Chlorthalidone 25 mg daily due to bradycardia (pulse of 35 in office).  Pt was also started on Jardiance at same visit.     Pt states after med changes 6/21/21, he felt dizzy, lightheaded, and had increased urinary frequency.  States after taking meds for 10 days, he stopped Jardiance and went back to Atenolol/Chlorthalidone 50/25 mg daily.   States he didn't know which med was causing him to feel this way so he stopped both and went back to his old BP med.     After discussing with patient, advised him that he has always been on chlorthalidone 25 mg daily, so this being the cause of his symptoms is not very likely.  In addition, his atenolol was stopped due to bradycardia and in office today, his pulse is again fluctuating, but getting no higher than 48 bpm.   Pt in agreement to stop Atenolol/Chlorthalidone 50/25 mg daily and resume Chlorthalidone 25 mg daily.   He will stay off Jardiance as he now believes this was causing his symptoms. Since stopping Jardicance his symptoms have resolved.     See recent note regarding diabetes and A1C.    Pt again is declining insulin or other injectable therapy as well as endo referral.      ASSESSMENT/PLAN:  1. Diabetes mellitus type 2 in obese (CMS/[facility]):  Uncontrolled, declines insulin, other injectable therapies such as GLP-1 drugs or consulation with endocrine.  Reviewed dietary change.  Start Jardiance, reviewed side effects.  3 mos        Please adivse -  Ok to resume Chlorthalidone 25 mg daily and stay off Jardiance?   Any replacement to Jardicance at this time or wait and discuss with Dr. Cadet upon his return to clinic?   
PHYSICAL EXAM/VITAL SIGNS( Pullset)/REVIEW OF SYSTEMS/PAST MEDICAL/SURGICAL/SOCIAL HISTORY/DISPOSITION/HIV/HISTORY OF PRESENT ILLNESS

## 2021-08-06 NOTE — ED ADULT TRIAGE NOTE - ACCOMPANIED BY
Date of Service: 08/06/2021    PREOPERATIVE DIAGNOSES:  1.  Postmenopausal bleeding.  2.  Cervical stenosis.  3.  Thickened endometrium.    POSTOPERATIVE DIAGNOSES:  1.  Postmenopausal bleeding.  2.  Cervical stenosis.  3.  Thickened endometrium.  4.  Endometrial polyp.    PROCEDURE:  Hysteroscopy, dilation and curettage, endometrial polypectomy under ultrasound guidance.    ANESTHESIA:  Monitored anesthesia care.    COMPLICATIONS:  None.    SURGEON:   Danisha Gleason MD.    ASSISTANT(S):  None.     INDICATIONS:  This is a 68-year-old woman who presented with postmenopausal bleeding.  An endometrial biopsy was attempted in the office; however, her os was found to be stenotic.  An ultrasound was performed which demonstrated a thickened lining.  The patient was then referred to me.  We discussed management options.  She opted for surgical management.  Risks and benefits of the procedure were discussed with the patient prior to the procedure and consent was signed.    FINDINGS:  1.  Endometrial polyp and mildly thickened endometrium on the left endometrial wall.  2.  Cervical stenosis.    DESCRIPTION OF PROCEDURE:  The patient was taken to the operating room where monitored anesthesia care was administered and found to be adequate.  She was placed in dorsal lithotomy position and prepped and draped in normal sterile fashion.  The cervix was identified and the os was found to be stenotic but was managed to be opened and dilated under ultrasound guidance.  The hysteroscope was inserted with Saline used as the distending medium and the above findings were noted.  The polyp was removed under direct visualization using the TrMegathread system.  Endometrial curetting was then performed.  Tenaculum was removed.  Hemostasis was noted.  The patient tolerated the procedure well.  Counts were correct x2.    ESTIMATED BLOOD LOSS:  Minimal.    SPECIMENS REMOVED:  Endometrial polyp and endometrial curettings.        Dictated By:  Danisha Gleason MD  Signing Provider: Danisha Gleason MD    BS/SP2 ([id number])  DD: 08/06/2021 15:30:33 TD: 08/06/2021 15:59:17    Copy Sent To:      Self

## 2021-11-04 NOTE — ED ADULT NURSE NOTE - CAS EDN DISCHARGE INTERVENTIONS
Patient Education     Prevention Guidelines, Men Ages 18 to 39  Screening tests and vaccines are an important part of managing your health. A screening test is done to find possible disorders or diseases in people who don't have any symptoms. The goal is to find a disease early so lifestyle changes can be made and you can be watched more closely to reduce the risk of disease, or to detect it early enough to treat it most effectively. Screening tests are not considered diagnostic, but are used to determine if more testing is needed. Health counseling is essential, too. Below are guidelines for these, for men ages 18 to 39. Talk with your healthcare provider to make sure you’re up-to-date on what you need.  Screening Who needs it How often   Alcohol misuse All men in this age group At routine exams   Blood pressure All men in this age group Yearly checkup if your blood pressure is normal  Normal blood pressure is less than 120/80  If your blood pressure is higher than normal, follow the advice of your healthcare provider.     All men in this age group At routine exams   Diabetes mellitus, type 2 All men who have no symptoms but are overweight or obese and have 1 or more other risk factors for diabetes At least every 3 years (yearly if blood sugar has already started to rise)   Diabetes mellitus, type 2  All men with prediabetes Every year   Hepatitis C If at increased risk At routine exams   High cholesterol or triglycerides All men ages 35 and older, and younger men at high risk for coronary artery disease At least every 5 years   HIV All men At routine exams   Obesity All men in this age group At routine exams   Syphilis Men at increased risk for infection - talk with your healthcare provider At routine exams   Tuberculosis Men at increased risk for infection - talk with your healthcare provider Check with your healthcare provider   Vision All men in this age group Every 5 to 10 years if no risk factors for eye  disease   Vaccines Who needs it How often   Chickenpox (varicella) All men in this age group who have no record of this infection or vaccine 2 doses; the second dose should be given at least 4 weeks after the first dose   Hepatitis A Men at increased risk for infection - talk with your healthcare provider 2 doses given at least 6 months apart   Hepatitis B Men at increased risk for infection - talk with your healthcare provider 3 doses over 6 months; second dose should be given 1 month after the first dose; the third dose should be given at least 2 months after the second dose and at least 4 months after the first dose   Haemophilus influenzae Type B (HIB) Men at increased risk for infection - talk with your healthcare provider 1 to 3 doses   Human papillomavirus (HPV) All men in this age group up to age 26 3 doses; the second dose should be given 1 to 2 months after the first dose and the third dose given 6 months after the first dose   Influenza (flu) All men in this age group Once a year   Measles, mumps, rubella (MMR) All men in this age group who have no record of these infections or vaccines 1 or 2 doses through age 55   Meningococcal Men at increased risk for infection - talk with your healthcare provider 1 or more doses   Pneumococca (PCV13) and Pneumococcal (PPSV23) Men at increased risk for infection - talk with your healthcare provider PCV13: 1 dose ages 19 to 65 (protects against 13 types of pneumococcal bacteria)  PPSV23: 1 to 2 doses through age 64, or 1 dose at 65 or older (protects against 23 types of pneumococcal bacteria)   Tetanus/diphtheria/pertussis (Td/Tdap) booster All men in this age group A one-time Tdap booster after age 18, then Td every10 years   Counseling Who needs it How often   Diet and exercise Overweight or obese people When diagnosed, and then at routine exams   Use of tobacco and the health effects it can cause All men in this age group Every visit   Sexually transmitted infection  prevention Men who are sexually active At routine exams   Skin cancer Prevention of skin cancer in fair-skinned adults through age 24 At routine exams   1Those who are 18 years of age, who are not up-to-date on their childhood immunizations, should receive all appropriate catch-up vaccines recommended by the CDC.  Date Last Reviewed: 2/1/2017  © 5395-3908 [employer], [employer]. [address] [place] [address], [place] [address]. All rights reserved. This information is not intended as a substitute for professional medical care. Always follow your healthcare professional's instructions.            IV discontinued, cath removed intact

## 2022-04-06 ENCOUNTER — EMERGENCY (EMERGENCY)
Facility: HOSPITAL | Age: 37
LOS: 1 days | Discharge: HOME | End: 2022-04-06
Attending: STUDENT IN AN ORGANIZED HEALTH CARE EDUCATION/TRAINING PROGRAM
Payer: COMMERCIAL

## 2022-04-06 VITALS
WEIGHT: 205.91 LBS | RESPIRATION RATE: 20 BRPM | SYSTOLIC BLOOD PRESSURE: 115 MMHG | OXYGEN SATURATION: 98 % | DIASTOLIC BLOOD PRESSURE: 78 MMHG | HEART RATE: 83 BPM | HEIGHT: 66 IN | TEMPERATURE: 99 F

## 2022-04-06 LAB
ALBUMIN SERPL ELPH-MCNC: 4.7 G/DL — SIGNIFICANT CHANGE UP (ref 3.3–5.2)
ALP SERPL-CCNC: 83 U/L — SIGNIFICANT CHANGE UP (ref 40–120)
ALT FLD-CCNC: 46 U/L — HIGH
ANION GAP SERPL CALC-SCNC: 10 MMOL/L — SIGNIFICANT CHANGE UP (ref 5–17)
AST SERPL-CCNC: 25 U/L — SIGNIFICANT CHANGE UP
BASOPHILS # BLD AUTO: 0.01 K/UL — SIGNIFICANT CHANGE UP (ref 0–0.2)
BASOPHILS NFR BLD AUTO: 0.3 % — SIGNIFICANT CHANGE UP (ref 0–2)
BILIRUB SERPL-MCNC: 0.4 MG/DL — SIGNIFICANT CHANGE UP (ref 0.4–2)
BUN SERPL-MCNC: 9.4 MG/DL — SIGNIFICANT CHANGE UP (ref 8–20)
CALCIUM SERPL-MCNC: 9.4 MG/DL — SIGNIFICANT CHANGE UP (ref 8.6–10.2)
CHLORIDE SERPL-SCNC: 102 MMOL/L — SIGNIFICANT CHANGE UP (ref 98–107)
CO2 SERPL-SCNC: 24 MMOL/L — SIGNIFICANT CHANGE UP (ref 22–29)
CREAT SERPL-MCNC: 0.93 MG/DL — SIGNIFICANT CHANGE UP (ref 0.5–1.3)
EGFR: 109 ML/MIN/1.73M2 — SIGNIFICANT CHANGE UP
EOSINOPHIL # BLD AUTO: 0.03 K/UL — SIGNIFICANT CHANGE UP (ref 0–0.5)
EOSINOPHIL NFR BLD AUTO: 0.8 % — SIGNIFICANT CHANGE UP (ref 0–6)
GLUCOSE SERPL-MCNC: 84 MG/DL — SIGNIFICANT CHANGE UP (ref 70–99)
HCT VFR BLD CALC: 48.3 % — SIGNIFICANT CHANGE UP (ref 39–50)
HGB BLD-MCNC: 17 G/DL — SIGNIFICANT CHANGE UP (ref 13–17)
IMM GRANULOCYTES NFR BLD AUTO: 0 % — SIGNIFICANT CHANGE UP (ref 0–1.5)
LIDOCAIN IGE QN: 10 U/L — LOW (ref 22–51)
LYMPHOCYTES # BLD AUTO: 0.63 K/UL — LOW (ref 1–3.3)
LYMPHOCYTES # BLD AUTO: 16.5 % — SIGNIFICANT CHANGE UP (ref 13–44)
MCHC RBC-ENTMCNC: 26.6 PG — LOW (ref 27–34)
MCHC RBC-ENTMCNC: 35.2 GM/DL — SIGNIFICANT CHANGE UP (ref 32–36)
MCV RBC AUTO: 75.7 FL — LOW (ref 80–100)
MONOCYTES # BLD AUTO: 0.37 K/UL — SIGNIFICANT CHANGE UP (ref 0–0.9)
MONOCYTES NFR BLD AUTO: 9.7 % — SIGNIFICANT CHANGE UP (ref 2–14)
NEUTROPHILS # BLD AUTO: 2.78 K/UL — SIGNIFICANT CHANGE UP (ref 1.8–7.4)
NEUTROPHILS NFR BLD AUTO: 72.7 % — SIGNIFICANT CHANGE UP (ref 43–77)
PLATELET # BLD AUTO: 176 K/UL — SIGNIFICANT CHANGE UP (ref 150–400)
POTASSIUM SERPL-MCNC: 4.4 MMOL/L — SIGNIFICANT CHANGE UP (ref 3.5–5.3)
POTASSIUM SERPL-SCNC: 4.4 MMOL/L — SIGNIFICANT CHANGE UP (ref 3.5–5.3)
PROT SERPL-MCNC: 7.7 G/DL — SIGNIFICANT CHANGE UP (ref 6.6–8.7)
RBC # BLD: 6.38 M/UL — HIGH (ref 4.2–5.8)
RBC # FLD: 12.7 % — SIGNIFICANT CHANGE UP (ref 10.3–14.5)
SODIUM SERPL-SCNC: 136 MMOL/L — SIGNIFICANT CHANGE UP (ref 135–145)
WBC # BLD: 3.82 K/UL — SIGNIFICANT CHANGE UP (ref 3.8–10.5)
WBC # FLD AUTO: 3.82 K/UL — SIGNIFICANT CHANGE UP (ref 3.8–10.5)

## 2022-04-06 PROCEDURE — 85025 COMPLETE CBC W/AUTO DIFF WBC: CPT

## 2022-04-06 PROCEDURE — 99283 EMERGENCY DEPT VISIT LOW MDM: CPT

## 2022-04-06 PROCEDURE — 83690 ASSAY OF LIPASE: CPT

## 2022-04-06 PROCEDURE — 80053 COMPREHEN METABOLIC PANEL: CPT

## 2022-04-06 PROCEDURE — 99284 EMERGENCY DEPT VISIT MOD MDM: CPT

## 2022-04-06 PROCEDURE — 36415 COLL VENOUS BLD VENIPUNCTURE: CPT

## 2022-04-06 RX ORDER — SODIUM CHLORIDE 9 MG/ML
1000 INJECTION INTRAMUSCULAR; INTRAVENOUS; SUBCUTANEOUS ONCE
Refills: 0 | Status: COMPLETED | OUTPATIENT
Start: 2022-04-06 | End: 2022-04-06

## 2022-04-06 RX ADMIN — Medication 30 MILLILITER(S): at 10:38

## 2022-04-06 RX ADMIN — SODIUM CHLORIDE 1000 MILLILITER(S): 9 INJECTION INTRAMUSCULAR; INTRAVENOUS; SUBCUTANEOUS at 11:03

## 2022-04-06 RX ADMIN — SODIUM CHLORIDE 1000 MILLILITER(S): 9 INJECTION INTRAMUSCULAR; INTRAVENOUS; SUBCUTANEOUS at 10:38

## 2022-04-06 NOTE — ED ADULT NURSE NOTE - OBJECTIVE STATEMENT
Pt is here with c/o epigastric pain and fever since yesterday.  Denies N/V/D, able to tolerate PO medication

## 2022-04-06 NOTE — ED PROVIDER NOTE - PROGRESS NOTE DETAILS
patient with no further bowel movements, feels better, tolerating po ,  abdominal pain resolved. labs reviewed, will d/c home

## 2022-04-06 NOTE — ED PROVIDER NOTE - OBJECTIVE STATEMENT
36 yr old M p/w abdominal pain and diarrhea since yesterday. denies vomiting or nausea but has poor appetite. endorses subjective fever and chills, no sick contacts no suspicious food intake no travel.

## 2022-04-06 NOTE — ED PROVIDER NOTE - PATIENT PORTAL LINK FT
You can access the FollowMyHealth Patient Portal offered by [facility] by registering at the following website: [email or web address]. By joining [employer]’s FollowMyHealth portal, you will also be able to view your health information using other applications (apps) compatible with our system.

## 2022-04-28 NOTE — ED ADULT NURSE NOTE - CHIEF COMPLAINT
CC:  Shawn Foster is here today for Re-established patient visit for left wrist and left elbow pain.  DOI: February 2022 (About 2 months ago; fell).  WC: No.  LOV: 4/12/18 (Seen >4 years ago for right wrist pain).    Referring MD:   Lukas Alcantara MD  PCP:  Fernando Sauceda MD  Medication verified, no changes  Patient would like communication of their results via:      Cell Phone:   Telephone Information:   Mobile [phone]     Okay to leave a message containing results? Yes  Tobacco history: Verified     The patient is a 36y Male complaining of abdominal pain.

## 2022-05-16 ENCOUNTER — NON-APPOINTMENT (OUTPATIENT)
Age: 37
End: 2022-05-16

## 2022-10-31 ENCOUNTER — EMERGENCY (EMERGENCY)
Facility: HOSPITAL | Age: 37
LOS: 1 days | Discharge: HOME | End: 2022-10-31
Attending: EMERGENCY MEDICINE
Payer: COMMERCIAL

## 2022-10-31 VITALS
OXYGEN SATURATION: 100 % | DIASTOLIC BLOOD PRESSURE: 76 MMHG | TEMPERATURE: 99 F | SYSTOLIC BLOOD PRESSURE: 121 MMHG | RESPIRATION RATE: 18 BRPM | HEART RATE: 81 BPM

## 2022-10-31 VITALS — WEIGHT: 156.09 LBS | HEIGHT: 66 IN

## 2022-10-31 PROCEDURE — 99283 EMERGENCY DEPT VISIT LOW MDM: CPT

## 2022-10-31 PROCEDURE — 99284 EMERGENCY DEPT VISIT MOD MDM: CPT

## 2022-10-31 PROCEDURE — 93005 ELECTROCARDIOGRAM TRACING: CPT

## 2022-10-31 PROCEDURE — 93010 ELECTROCARDIOGRAM REPORT: CPT

## 2022-10-31 NOTE — ED PROVIDER NOTE - CLINICAL SUMMARY MEDICAL DECISION MAKING FREE TEXT BOX
EKG results reviewed with patient; analgesics, rest, PMD or clinic follow up recommended for reassessment. Patient is aware of signs/symptoms to return to the emergency department.

## 2022-10-31 NOTE — ED PROVIDER NOTE - PATIENT PORTAL LINK FT
You can access the FollowMyHealth Patient Portal offered by [facility] by registering at the following website: [email or web address]. By joining [employer]’s FollowMyHealth portal, you will also be able to view your health information using other applications (apps) compatible with our system.

## 2022-10-31 NOTE — ED PROVIDER NOTE - NSFOLLOWUPCLINICS_GEN_ALL_ED_FT
[facility]  Cardiology  [address], [address]  [place] [address]  Phone: [phone]  Fax:

## 2022-11-03 NOTE — ED ADULT TRIAGE NOTE - NS ED TRIAGE AVPU SCALE
Alert-The patient is alert, awake and responds to voice. The patient is oriented to time, place, and person. The triage nurse is able to obtain subjective information. Xerosis Aggressive Treatment: I recommended application of Cetaphil or CeraVe numerous times a day and before going to bed to all dry areas. I also prescribed a topical steroid for twice daily use.

## 2023-03-15 NOTE — ED STATDOCS - NS ED MD EM SELECTION
FAMILY HISTORY:  Father  Still living? Yes, Estimated age: Age Unknown  FH: heart attack, Age at diagnosis: Age Unknown    Mother  Still living? Yes, Estimated age: 51-60  Family history of lupus erythematosus, Age at diagnosis: Age Unknown    
[address] Detailed

## 2023-10-23 ENCOUNTER — EMERGENCY (EMERGENCY)
Facility: HOSPITAL | Age: 38
LOS: 1 days | Discharge: HOME | End: 2023-10-23
Attending: EMERGENCY MEDICINE
Payer: COMMERCIAL

## 2023-10-23 VITALS
WEIGHT: 192.24 LBS | DIASTOLIC BLOOD PRESSURE: 69 MMHG | RESPIRATION RATE: 16 BRPM | OXYGEN SATURATION: 96 % | HEIGHT: 66 IN | SYSTOLIC BLOOD PRESSURE: 124 MMHG | TEMPERATURE: 99 F | HEART RATE: 66 BPM

## 2023-10-23 LAB
ALBUMIN SERPL ELPH-MCNC: 4.4 G/DL — SIGNIFICANT CHANGE UP (ref 3.3–5.2)
ALBUMIN SERPL ELPH-MCNC: 4.4 G/DL — SIGNIFICANT CHANGE UP (ref 3.3–5.2)
ALP SERPL-CCNC: 65 U/L — SIGNIFICANT CHANGE UP (ref 40–120)
ALP SERPL-CCNC: 65 U/L — SIGNIFICANT CHANGE UP (ref 40–120)
ALT FLD-CCNC: 23 U/L — SIGNIFICANT CHANGE UP
ALT FLD-CCNC: 23 U/L — SIGNIFICANT CHANGE UP
ANION GAP SERPL CALC-SCNC: 8 MMOL/L — SIGNIFICANT CHANGE UP (ref 5–17)
ANION GAP SERPL CALC-SCNC: 8 MMOL/L — SIGNIFICANT CHANGE UP (ref 5–17)
ANISOCYTOSIS BLD QL: SLIGHT — SIGNIFICANT CHANGE UP
ANISOCYTOSIS BLD QL: SLIGHT — SIGNIFICANT CHANGE UP
AST SERPL-CCNC: 16 U/L — SIGNIFICANT CHANGE UP
AST SERPL-CCNC: 16 U/L — SIGNIFICANT CHANGE UP
BASOPHILS # BLD AUTO: 0 K/UL — SIGNIFICANT CHANGE UP (ref 0–0.2)
BASOPHILS # BLD AUTO: 0 K/UL — SIGNIFICANT CHANGE UP (ref 0–0.2)
BASOPHILS NFR BLD AUTO: 0 % — SIGNIFICANT CHANGE UP (ref 0–2)
BASOPHILS NFR BLD AUTO: 0 % — SIGNIFICANT CHANGE UP (ref 0–2)
BILIRUB SERPL-MCNC: 0.3 MG/DL — LOW (ref 0.4–2)
BILIRUB SERPL-MCNC: 0.3 MG/DL — LOW (ref 0.4–2)
BUN SERPL-MCNC: 12.7 MG/DL — SIGNIFICANT CHANGE UP (ref 8–20)
BUN SERPL-MCNC: 12.7 MG/DL — SIGNIFICANT CHANGE UP (ref 8–20)
CALCIUM SERPL-MCNC: 9.1 MG/DL — SIGNIFICANT CHANGE UP (ref 8.4–10.5)
CALCIUM SERPL-MCNC: 9.1 MG/DL — SIGNIFICANT CHANGE UP (ref 8.4–10.5)
CHLORIDE SERPL-SCNC: 105 MMOL/L — SIGNIFICANT CHANGE UP (ref 96–108)
CHLORIDE SERPL-SCNC: 105 MMOL/L — SIGNIFICANT CHANGE UP (ref 96–108)
CO2 SERPL-SCNC: 27 MMOL/L — SIGNIFICANT CHANGE UP (ref 22–29)
CO2 SERPL-SCNC: 27 MMOL/L — SIGNIFICANT CHANGE UP (ref 22–29)
CREAT SERPL-MCNC: 0.82 MG/DL — SIGNIFICANT CHANGE UP (ref 0.5–1.3)
CREAT SERPL-MCNC: 0.82 MG/DL — SIGNIFICANT CHANGE UP (ref 0.5–1.3)
EGFR: 115 ML/MIN/1.73M2 — SIGNIFICANT CHANGE UP
EGFR: 115 ML/MIN/1.73M2 — SIGNIFICANT CHANGE UP
EOSINOPHIL # BLD AUTO: 0.12 K/UL — SIGNIFICANT CHANGE UP (ref 0–0.5)
EOSINOPHIL # BLD AUTO: 0.12 K/UL — SIGNIFICANT CHANGE UP (ref 0–0.5)
EOSINOPHIL NFR BLD AUTO: 1.8 % — SIGNIFICANT CHANGE UP (ref 0–6)
EOSINOPHIL NFR BLD AUTO: 1.8 % — SIGNIFICANT CHANGE UP (ref 0–6)
GLUCOSE SERPL-MCNC: 79 MG/DL — SIGNIFICANT CHANGE UP (ref 70–99)
GLUCOSE SERPL-MCNC: 79 MG/DL — SIGNIFICANT CHANGE UP (ref 70–99)
HCT VFR BLD CALC: 40.2 % — SIGNIFICANT CHANGE UP (ref 39–50)
HCT VFR BLD CALC: 40.2 % — SIGNIFICANT CHANGE UP (ref 39–50)
HGB BLD-MCNC: 14.1 G/DL — SIGNIFICANT CHANGE UP (ref 13–17)
HGB BLD-MCNC: 14.1 G/DL — SIGNIFICANT CHANGE UP (ref 13–17)
LYMPHOCYTES # BLD AUTO: 2.11 K/UL — SIGNIFICANT CHANGE UP (ref 1–3.3)
LYMPHOCYTES # BLD AUTO: 2.11 K/UL — SIGNIFICANT CHANGE UP (ref 1–3.3)
LYMPHOCYTES # BLD AUTO: 32.1 % — SIGNIFICANT CHANGE UP (ref 13–44)
LYMPHOCYTES # BLD AUTO: 32.1 % — SIGNIFICANT CHANGE UP (ref 13–44)
MANUAL SMEAR VERIFICATION: SIGNIFICANT CHANGE UP
MANUAL SMEAR VERIFICATION: SIGNIFICANT CHANGE UP
MCHC RBC-ENTMCNC: 26.2 PG — LOW (ref 27–34)
MCHC RBC-ENTMCNC: 26.2 PG — LOW (ref 27–34)
MCHC RBC-ENTMCNC: 35.1 GM/DL — SIGNIFICANT CHANGE UP (ref 32–36)
MCHC RBC-ENTMCNC: 35.1 GM/DL — SIGNIFICANT CHANGE UP (ref 32–36)
MCV RBC AUTO: 74.7 FL — LOW (ref 80–100)
MCV RBC AUTO: 74.7 FL — LOW (ref 80–100)
MICROCYTES BLD QL: SLIGHT — SIGNIFICANT CHANGE UP
MICROCYTES BLD QL: SLIGHT — SIGNIFICANT CHANGE UP
MONOCYTES # BLD AUTO: 0.36 K/UL — SIGNIFICANT CHANGE UP (ref 0–0.9)
MONOCYTES # BLD AUTO: 0.36 K/UL — SIGNIFICANT CHANGE UP (ref 0–0.9)
MONOCYTES NFR BLD AUTO: 5.4 % — SIGNIFICANT CHANGE UP (ref 2–14)
MONOCYTES NFR BLD AUTO: 5.4 % — SIGNIFICANT CHANGE UP (ref 2–14)
NEUTROPHILS # BLD AUTO: 3.64 K/UL — SIGNIFICANT CHANGE UP (ref 1.8–7.4)
NEUTROPHILS # BLD AUTO: 3.64 K/UL — SIGNIFICANT CHANGE UP (ref 1.8–7.4)
NEUTROPHILS NFR BLD AUTO: 55.3 % — SIGNIFICANT CHANGE UP (ref 43–77)
NEUTROPHILS NFR BLD AUTO: 55.3 % — SIGNIFICANT CHANGE UP (ref 43–77)
PLAT MORPH BLD: NORMAL — SIGNIFICANT CHANGE UP
PLAT MORPH BLD: NORMAL — SIGNIFICANT CHANGE UP
PLATELET # BLD AUTO: 183 K/UL — SIGNIFICANT CHANGE UP (ref 150–400)
PLATELET # BLD AUTO: 183 K/UL — SIGNIFICANT CHANGE UP (ref 150–400)
POLYCHROMASIA BLD QL SMEAR: SLIGHT — SIGNIFICANT CHANGE UP
POLYCHROMASIA BLD QL SMEAR: SLIGHT — SIGNIFICANT CHANGE UP
POTASSIUM SERPL-MCNC: 4.5 MMOL/L — SIGNIFICANT CHANGE UP (ref 3.5–5.3)
POTASSIUM SERPL-MCNC: 4.5 MMOL/L — SIGNIFICANT CHANGE UP (ref 3.5–5.3)
POTASSIUM SERPL-SCNC: 4.5 MMOL/L — SIGNIFICANT CHANGE UP (ref 3.5–5.3)
POTASSIUM SERPL-SCNC: 4.5 MMOL/L — SIGNIFICANT CHANGE UP (ref 3.5–5.3)
PROT SERPL-MCNC: 6.4 G/DL — LOW (ref 6.6–8.7)
PROT SERPL-MCNC: 6.4 G/DL — LOW (ref 6.6–8.7)
RBC # BLD: 5.38 M/UL — SIGNIFICANT CHANGE UP (ref 4.2–5.8)
RBC # BLD: 5.38 M/UL — SIGNIFICANT CHANGE UP (ref 4.2–5.8)
RBC # FLD: 13.1 % — SIGNIFICANT CHANGE UP (ref 10.3–14.5)
RBC # FLD: 13.1 % — SIGNIFICANT CHANGE UP (ref 10.3–14.5)
RBC BLD AUTO: ABNORMAL
RBC BLD AUTO: ABNORMAL
SMUDGE CELLS # BLD: PRESENT — SIGNIFICANT CHANGE UP
SMUDGE CELLS # BLD: PRESENT — SIGNIFICANT CHANGE UP
SODIUM SERPL-SCNC: 140 MMOL/L — SIGNIFICANT CHANGE UP (ref 135–145)
SODIUM SERPL-SCNC: 140 MMOL/L — SIGNIFICANT CHANGE UP (ref 135–145)
TROPONIN T SERPL-MCNC: <0.01 NG/ML — SIGNIFICANT CHANGE UP (ref 0–0.06)
TROPONIN T SERPL-MCNC: <0.01 NG/ML — SIGNIFICANT CHANGE UP (ref 0–0.06)
VARIANT LYMPHS # BLD: 5.4 % — SIGNIFICANT CHANGE UP (ref 0–6)
VARIANT LYMPHS # BLD: 5.4 % — SIGNIFICANT CHANGE UP (ref 0–6)
WBC # BLD: 6.58 K/UL — SIGNIFICANT CHANGE UP (ref 3.8–10.5)
WBC # BLD: 6.58 K/UL — SIGNIFICANT CHANGE UP (ref 3.8–10.5)
WBC # FLD AUTO: 6.58 K/UL — SIGNIFICANT CHANGE UP (ref 3.8–10.5)
WBC # FLD AUTO: 6.58 K/UL — SIGNIFICANT CHANGE UP (ref 3.8–10.5)

## 2023-10-23 PROCEDURE — 85025 COMPLETE CBC W/AUTO DIFF WBC: CPT

## 2023-10-23 PROCEDURE — T1013: CPT

## 2023-10-23 PROCEDURE — 99285 EMERGENCY DEPT VISIT HI MDM: CPT | Mod: 25

## 2023-10-23 PROCEDURE — 93010 ELECTROCARDIOGRAM REPORT: CPT

## 2023-10-23 PROCEDURE — 36415 COLL VENOUS BLD VENIPUNCTURE: CPT

## 2023-10-23 PROCEDURE — 96374 THER/PROPH/DIAG INJ IV PUSH: CPT

## 2023-10-23 PROCEDURE — 80053 COMPREHEN METABOLIC PANEL: CPT

## 2023-10-23 PROCEDURE — 84484 ASSAY OF TROPONIN QUANT: CPT

## 2023-10-23 PROCEDURE — 71045 X-RAY EXAM CHEST 1 VIEW: CPT | Mod: 26

## 2023-10-23 PROCEDURE — 99285 EMERGENCY DEPT VISIT HI MDM: CPT

## 2023-10-23 PROCEDURE — 93005 ELECTROCARDIOGRAM TRACING: CPT

## 2023-10-23 PROCEDURE — 71045 X-RAY EXAM CHEST 1 VIEW: CPT

## 2023-10-23 RX ORDER — KETOROLAC TROMETHAMINE 30 MG/ML
15 SYRINGE (ML) INJECTION ONCE
Refills: 0 | Status: DISCONTINUED | OUTPATIENT
Start: 2023-10-23 | End: 2023-10-23

## 2023-10-23 RX ORDER — ASPIRIN/CALCIUM CARB/MAGNESIUM 324 MG
324 TABLET ORAL ONCE
Refills: 0 | Status: COMPLETED | OUTPATIENT
Start: 2023-10-23 | End: 2023-10-23

## 2023-10-23 RX ADMIN — Medication 324 MILLIGRAM(S): at 21:36

## 2023-10-23 RX ADMIN — Medication 15 MILLIGRAM(S): at 22:22

## 2023-10-23 NOTE — ED PROVIDER NOTE - OBJECTIVE STATEMENT
38-year-old male no past medical history presents with intermittent sharp midsternal and left-sided chest pain for the past 3 days.  Patient report lifting heavy objects often.  No exertional dyspnea no prior chest pain.  No fever, no cough or congestion.  Patient states strong family history of cardiac disease with his cousins having MIs in the 50s.    Albanian interrepter: Vladislav

## 2023-10-23 NOTE — ED PROVIDER NOTE - NSFOLLOWUPCLINICS_GEN_ALL_ED_FT
[facility]  Cardiology  [address] [place] [address]  Phone: [phone]  Fax:   Follow Up Time: Routine

## 2023-10-23 NOTE — ED PROVIDER NOTE - PHYSICAL EXAMINATION
VITAL SIGNS: I have reviewed nursing notes and confirm.  CONSTITUTIONAL:  in no acute distress.  SKIN: Skin exam is warm and dry, no acute rash.  HEAD: Normocephalic; atraumatic.  EYES: PERRL, EOM intact; conjunctiva and sclera clear.  ENT: No nasal discharge; airway clear. Throat clear.  NECK: Supple; non tender.    CARD: Regular rate and rhythm. (+) reproducible midsternal chest pain  RESP: No wheezes,  no rales or rhonchi.   ABD:  soft; non-distended; non-tender;   EXT: Normal ROM. No clubbing, cyanosis or edema.  NEURO: Alert, oriented. Grossly unremarkable. No focal deficits.  moves all extremities,  normal gait   PSYCH: Cooperative, appropriate.

## 2023-10-23 NOTE — ED ADULT NURSE NOTE - OBJECTIVE STATEMENT
patient a/ox3 c/o chest pain that comes intermittently and began today. patient denies recent fever, shortness of breath. dizziness, visual changes, N/V/D, or weakness. patient states lifting heavy materials recently at work. patient denies allergies.

## 2023-10-23 NOTE — ED PROVIDER NOTE - CLINICAL SUMMARY MEDICAL DECISION MAKING FREE TEXT BOX
38-year-old male no past medical history presents with intermittent sharp midsternal and left-sided chest pain for the past 3 days.  Patient report lifting heavy objects often.  No exertional dyspnea no prior chest pain.  As interpreted by ED physician, ECG is NSR with  no ST/T changes.  we will get blood work, troponin, chest x-ray.  Aspirin for pain. 38-year-old male no past medical history presents with intermittent sharp midsternal and left-sided chest pain for the past 3 days.  Patient report lifting heavy objects often.  No exertional dyspnea no prior chest pain.  As interpreted by ED physician, ECG is NSR with  no ST/T changes.  we will get blood work, troponin, chest x-ray.  Aspirin for pain. Lab values do not require emergent intervention. trop negative. As interpreted by undersigned physician, chest xray demonstrates no acute pathology. toradol given for pain. patient will need outpatient cardiology follow up .

## 2023-10-23 NOTE — ED ADULT TRIAGE NOTE - PAIN: PRESENCE, MLM
HPI:     Chief Complaint     Lesion        HPI     Lesion      Additional comments: LOV: 4-24-14 with SD.  Pt presents today with red crusted lesion to R temple x5 months that pt notes got raised fell off and then appeared again also requesting eval of venessa Disp: 1 kit Rfl: 0   Glucose Blood (ACCU-CHEK ADITYA PLUS) In Vitro Strip USE ONCE DAILY IN THE MORNING BEFORE BREAKFAST Disp: 100 strip Rfl: 3   LANCETS ULTRA THIN Does not apply Misc Use one daily Disp: 100 each Rfl: 6   Blood Gluc Meter Disp-Strips Does complains of pain/discomfort very rarely      Drug use: No      Sexual activity: Not on file    Other Topics      Concerns:         Service: Not Asked        Blood Transfusions: Not Asked        Caffeine Concern: Yes          Coffee, 2 cups per day         Occupational Exposur application of broad-spectrum sunblock SPF 30 or higher discussed. Patient understands to put it on  15-20 minutes before outsides so that  it is absorbed and working and to reapply it every few hours.       Orders Placed This Encounter      BIOPSY OF SKIN

## 2023-10-23 NOTE — ED PROVIDER NOTE - NSFOLLOWUPINSTRUCTIONS_ED_ALL_ED_FT
Dolor de pecho no específico en los adultos  Nonspecific Chest Pain, Adult  El dolor de pecho es rahel sensación molesta, opresiva o dolorosa en el pecho. El dolor se siente vance rahel aplastamiento, torsión u opresión en el pecho. Rahel persona puede sentir rahel sensación de ardor u hormigueo. El dolor de pecho también se puede sentir en la espalda, el laya, la mandíbula, el hombro o el brazo. Slime dolor puede empeorar al moverse, estornudar o respirar profundamente.    El dolor de pecho puede deberse a rahel afección potencialmente mortal. Se debe tratar de inmediato. También puede ser provocado por algo que no es potencialmente mortal. Si tiene dolor de pecho, puede ser difícil saber la diferencia, por lo tanto es importante que obtenga ayuda de inmediato para asegurarse de que no tiene rahel afección grave.    Algunas causas potencialmente mortales del dolor de pecho incluyen:  Infarto de miocardio.  Un desgarro en el vaso sanguíneo principal del cuerpo (disección aórtica).  Inflamación alrededor del corazón (pericarditis).  Un problema en los pulmones, vance un coágulo de meagan (embolia pulmonar) o un pulmón colapsado (neumotórax).  Algunas causas de dolor de pecho que no son potencialmente mortales incluyen:  Acidez estomacal.  Ansiedad o estrés.  Daño de los huesos, los músculos y los cartílagos que conforman la pared torácica.  Neumonía o bronquitis.  Culebrilla (virus de la varicela zóster).  El dolor de pecho puede aparecer y desaparecer. También puede ser nima. Bowers médico le hará pruebas clínicas y otros estudios para tratar de determinar la causa del dolor. El tratamiento dependerá de la causa del dolor de pecho.    Siga estas instrucciones en bowers casa:  Medicamentos    Use los medicamentos de venta jennifer y los recetados solamente vance se lo haya indicado el médico.  Si le recetaron un antibiótico, tómelo vance se lo haya indicado el médico. No deje de alice el antibiótico aunque comience a sentirse mejor.  Actividad    Evite las actividades que le causen dolor de pecho.  No levante ningún objeto que pese más de 10 libras (4,5 kg) o que supere el límite de peso que le hayan indicado, hasta que el médico le diga que puede hacerlo.  Chilango reposo vance se lo haya indicado el médico.  Retome ken actividades normales solo vance se lo haya indicado el médico. Pregúntele al médico qué actividades son seguras para usted.  Estilo de venita    A plate along with examples of foods in a healthy diet.  Silhouette of a person sitting on the floor doing yoga.  No consuma ningún producto que contenga nicotina o tabaco, vance cigarrillos, cigarrillos electrónicos y tabaco de mascar. Si necesita ayuda para dejar de fumar, consulte al médico.  No nathan alcohol.  Opte por un estilo de venita saludable vance se lo hayan recomendado. Puede incluir:  Practicar actividad física con regularidad. Pídale al médico que le sugiera ejercicios que sreekanth seguros para usted.  Seguir rahel dieta cardiosaludable. Esta debe incluir muchas frutas y verduras frescas, cereales integrales, proteínas (magras) con bajo contenido de grasa y productos lácteos bajos en grasa. Un nutricionista podrá ayudarlo a hacer elecciones de alimentación saludables.  Mantener un peso saludable.  Controlar cualquier otra afección que tenga, vance presión arterial samantha (hipertensión) o diabetes.  Disminuir el nivel de estrés; por ejemplo, con yoga o técnicas de relajación.  Instrucciones generales    Esté atento a cualquier cambio en los síntomas.  Es bowers responsabilidad obtener los resultados de cualquier prueba que se haya realizado. Consulte al médico o pregunte en el departamento donde se realizan las pruebas cuándo estarán listos los resultados.  Concurra a todas las visitas de seguimiento vance se lo haya indicado el médico. [place] es importante.  Es posible que le pidan que se someta a más pruebas si el dolor de pecho no desaparece.  Comuníquese con un médico si:  El dolor de pecho no desaparece.  Se siente deprimido.  Tiene fiebre.  Nota cambios en los síntomas o desarrolla síntomas nuevos.  Solicite ayuda de inmediato si:  El dolor en el pecho empeora.  Tiene tos que empeora o tose con meagan.  Siente un dolor intenso en el abdomen.  Se desmaya.  Tiene rahel molestia repentina e inexplicable en el pecho.  Tiene molestias repentinas e inexplicables en los brazos, la espalda, el laya o la mandíbula.  Le falta el aire en cualquier momento.  Comienza a sudar de manera repentina o la piel se le humedece.  Siente náuseas o vomita.  Se siente repentinamente mareado o se desmaya.  Tiene debilidad intensa, o debilidad o fatiga sin explicación.  Siente que el corazón comienza a latir rápidamente o que se saltea latidos.  Estos síntomas pueden representar un problema grave que constituye rahel emergencia. No espere a feliberto si los síntomas desaparecen. Solicite atención médica de inmediato. Comuníquese con el servicio de emergencias de bowers localidad (911 en los [place]). No conduzca por ken propios medios hasta el hospital.    Resumen  El dolor de pecho puede ser provocado por rahel afección que es grave y requiere tratamiento urgente. También puede ser provocado por algo que no es potencialmente mortal.  El médico puede hacerle pruebas clínicas y otros estudios para tratar de determinar la causa del dolor.  Siga las instrucciones de bowers médico sobre alice medicamentos, hacer cambios en bowers estilo de venita y recibir tratamiento de urgencia si los síntomas empeoran.  Concurra a todas las visitas de seguimiento vance se lo haya indicado el médico. [place] incluye las visitas para realizarle otras pruebas si el dolor de pecho no desaparece.  Esta información no tiene vance fin reemplazar el consejo del médico. Asegúrese de hacerle al médico cualquier pregunta que tenga.

## 2023-10-23 NOTE — ED PROVIDER NOTE - NS ED ROS FT
CONSTITUTIONAL - no  fever, no diaphoresis, no weight change  SKIN - no rash  HEMATOLOGIC - no bleeding, no bruising  EYES - no eye pain, no blurred vision  ENT - no change in hearing, no pain  RESPIRATORY - no shortness of breath, no cough  CARDIAC - (+) chest pain, no palpitations  GI - no abd pain, no nausea, no vomiting, no diarrhea, no constipation, no bleeding  GENITO-URINARY - no discharge, no dysuria; no hematuria,   ENDO - no polydipsia, no polyuria, no heat/no cold intolerance  MUSCULOSKELETAL - no joint pain, no swelling, no redness  NEUROLOGIC - no weakness, no headache, no anesthesia, no paresthesias  PSYCH - no anxiety, non suicidal, non homicidal, no hallucination, no depression

## 2023-10-23 NOTE — ED PROVIDER NOTE - PATIENT PORTAL LINK FT
You can access the FollowMyHealth Patient Portal offered by [facility] by registering at the following website: [email or web address]. By joining [employer]’s FollowMyHealth portal, you will also be able to view your health information using other applications (apps) compatible with our system.

## 2024-01-03 NOTE — ED ADULT NURSE NOTE - NS ED NURSE DISCH DISPOSITION
BRONCHOSPASM/BRONCHOCONSTRICTION     [x]         IMPROVE AERATION/BREATH SOUNDS  [x]   ADMINISTER BRONCHODILATOR THERAPY AS APPROPRIATE  [x]   ASSESS BREATH SOUNDS  []   IMPLEMENT AEROSOL/MDI PROTOCOL  [x]   PATIENT EDUCATION AS NEEDED     Discharged

## 2024-07-26 ENCOUNTER — APPOINTMENT (OUTPATIENT)
Dept: GASTROENTEROLOGY | Facility: CLINIC | Age: 39
End: 2024-07-26

## 2024-10-02 ENCOUNTER — EMERGENCY (EMERGENCY)
Facility: HOSPITAL | Age: 39
LOS: 1 days | Discharge: HOME | End: 2024-10-02
Attending: EMERGENCY MEDICINE
Payer: COMMERCIAL

## 2024-10-02 VITALS
OXYGEN SATURATION: 100 % | RESPIRATION RATE: 18 BRPM | HEART RATE: 73 BPM | DIASTOLIC BLOOD PRESSURE: 82 MMHG | SYSTOLIC BLOOD PRESSURE: 132 MMHG | TEMPERATURE: 98 F

## 2024-10-02 VITALS
HEART RATE: 81 BPM | WEIGHT: 188.05 LBS | RESPIRATION RATE: 20 BRPM | OXYGEN SATURATION: 99 % | TEMPERATURE: 99 F | SYSTOLIC BLOOD PRESSURE: 123 MMHG | HEIGHT: 69 IN | DIASTOLIC BLOOD PRESSURE: 78 MMHG

## 2024-10-02 PROCEDURE — 99284 EMERGENCY DEPT VISIT MOD MDM: CPT | Mod: 25

## 2024-10-02 PROCEDURE — 99284 EMERGENCY DEPT VISIT MOD MDM: CPT

## 2024-10-02 RX ORDER — LIDOCAINE/BENZALKONIUM/ALCOHOL
1 SOLUTION, NON-ORAL TOPICAL ONCE
Refills: 0 | Status: COMPLETED | OUTPATIENT
Start: 2024-10-02 | End: 2024-10-02

## 2024-10-02 RX ORDER — ACETAMINOPHEN 325 MG/1
975 TABLET ORAL ONCE
Refills: 0 | Status: COMPLETED | OUTPATIENT
Start: 2024-10-02 | End: 2024-10-02

## 2024-10-02 RX ORDER — IBUPROFEN 600 MG
800 TABLET ORAL ONCE
Refills: 0 | Status: COMPLETED | OUTPATIENT
Start: 2024-10-02 | End: 2024-10-02

## 2024-10-02 RX ADMIN — Medication 800 MILLIGRAM(S): at 11:12

## 2024-10-02 RX ADMIN — Medication 800 MILLIGRAM(S): at 11:45

## 2024-10-02 RX ADMIN — ACETAMINOPHEN 975 MILLIGRAM(S): 325 TABLET ORAL at 11:45

## 2024-10-02 RX ADMIN — ACETAMINOPHEN 975 MILLIGRAM(S): 325 TABLET ORAL at 11:12

## 2024-10-02 RX ADMIN — Medication 1 PATCH: at 11:35
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
## 2024-10-02 NOTE — ED PROVIDER NOTE - PATIENT PORTAL LINK FT
You can access the FollowMyHealth Patient Portal offered by [facility] by registering at the following website: [email or web address]. By joining [employer]’s FollowMyHealth portal, you will also be able to view your health information using other applications (apps) compatible with our system.
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
## 2024-10-02 NOTE — ED PROVIDER NOTE - PHYSICAL EXAMINATION
Attn - alert, nad, head - NC/AT, no facial tenderness, mandible and TMJ are NT, tongue - no trauma, PERRL 3 mm, nose - NT, no deformity or signs of epistaxis, neck/spine/back - tender para C and para L bilat, no midline tenderness, ROM neck without neuro symptoms, Chest/ribs - tender anterior chest/sternum not localized, without signs of trauma, Lungs - clear, good BS bilaterally without splinting, Cor - RR, no M, Abdo - soft, NT, ND, no HSM or tenderness,  no CVAT, Pelvis/hips - NT, and no pain with AROM.  UExt - Right - ROM with pain, tender entire RUE, no swelling ecchymosis or deformity , Left -  ROM without pain, NT.  LExt - Right - ROM with pain, tender entire RUE, no swelling ecchymosis or deformity.  Left -  ROM without pain, mild tenderness medial ankle.  Neuro - CN, motor, sensory, cerebellar, speech intact and non focal.  Skin - intact and without trauma

## 2024-10-02 NOTE — ED PROVIDER NOTE - CARE PROVIDER_API CALL
Anthony Simons  Orthopaedic Surgery  [address] [place] [address]  Phone: [phone]  Fax: [phone]  Follow Up Time: 7-10 Days
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
## 2024-10-02 NOTE — ED ADULT TRIAGE NOTE - HEIGHT IN CM
March 3, 2017         Rosa M Reeves  [address] [place] [address]        Dear Pat:      Below are the results from your recent visit:    Resulted Orders   CT-CHEST (THORAX) W/O    Narrative    3/2/2017 2:54 PM    HISTORY/REASON FOR EXAM:  Right upper lobe groundglass follow-up    TECHNIQUE/EXAM DESCRIPTION:  CT thorax without contrast.    Noncontrast helical scanning of the chest was obtained from the lung apices through the adrenal glands.    Low dose optimization technique was utilized for this CT exam including automated exposure control and adjustment of the mA and/or kV according to patient size.    COMPARISON:  12/22/2016    FINDINGS:  Lung windows demonstrate no change. There is a 16 x 12 mm right upper lobe groundglass nodule without a defined solid component.    Left lower lobe superior segment calcified nodule    Moderate coronary artery disease without cardiac enlargement    Soft tissue windows demonstrate no lymphadenopathy. Note that lack of contrast does diminish the sensitivity of the study in evaluation of the soft tissues.    There is no pleural or pericardial effusion.    No acute bony abnormality.      Impression    Stable unenhanced CT of the thorax.    Stable 16 mm right upper lobe groundglass nodule differential includes adenocarcinoma in situ and NSIP. Annual follow-up CT is advised per Fleischner Society guidelines     The test results show that the Chest CT does show the nodule in the right upper lobe but this is unchanged.  As a result, the recommendation is to just repeat the CT in one year. .    If you have any questions or concerns, please don't hesitate to call.        Sincerely,      Faith Burr D.O.    Electronically Signed  
175.26

## 2024-10-02 NOTE — ED ADULT NURSE NOTE - CHPI ED NUR SYMPTOMS NEG
no acting out behaviors/no difficulty bearing weight/no disorientation/no dizziness/no fussiness/no headache/no laceration/no loss of consciousness/no neck tenderness

## 2024-10-24 NOTE — ED PROVIDER NOTE - IV ALTEPLASE ADMIN OUTSIDE HIDDEN
Amount Injected: 40 mg Ndc (80 Mg/0.8): [address]-54 Hide Non-Enhanced Ndc Variable: No Detail Level: None Expiration Date (Optional): 02/2026 J-Code:  Administered By (Optional): Hilton Tracy PA-C Ndc (40 Mg/0.8): [address]-73 Ndc (40 Mg/0.4): [address]-04 Syringe Size Used (Required For Enhanced Ndc): 40 mg/0.8ml Single Use Pen Use Enhanced Ndc?: Yes Treatment Number (Optional): 16 Lot # (Optional): [id number] Date Of Next Injection: 2 Weeks Consent: The risks of pain and injection site reactions were reviewed with the patient prior to the injection. show J-Code Units: 1